# Patient Record
Sex: FEMALE | Race: WHITE | NOT HISPANIC OR LATINO | Employment: FULL TIME | URBAN - METROPOLITAN AREA
[De-identification: names, ages, dates, MRNs, and addresses within clinical notes are randomized per-mention and may not be internally consistent; named-entity substitution may affect disease eponyms.]

---

## 2017-06-21 ENCOUNTER — ANESTHESIA (OUTPATIENT)
Dept: LABOR AND DELIVERY | Facility: HOSPITAL | Age: 32
End: 2017-06-21

## 2017-06-21 ENCOUNTER — HOSPITAL ENCOUNTER (INPATIENT)
Facility: HOSPITAL | Age: 32
LOS: 2 days | Discharge: HOME | End: 2017-06-23
Attending: OBSTETRICS & GYNECOLOGY | Admitting: OBSTETRICS & GYNECOLOGY

## 2017-06-21 ENCOUNTER — ANESTHESIA EVENT (OUTPATIENT)
Dept: LABOR AND DELIVERY | Facility: HOSPITAL | Age: 32
End: 2017-06-21

## 2017-06-21 PROBLEM — Z34.90 PREGNANCY: Status: RESOLVED | Noted: 2017-06-21 | Resolved: 2017-06-21

## 2017-06-21 PROBLEM — Z34.90 PREGNANCY: Status: ACTIVE | Noted: 2017-06-21

## 2017-06-21 LAB
ABO GROUP BLD: NORMAL
ABO GROUP BLD: NORMAL
ALBUMIN SERPL-MCNC: 3.6 G/DL (ref 3.5–5.2)
ALBUMIN/GLOB SERPL: 1.1 G/DL
ALP SERPL-CCNC: 165 U/L (ref 39–117)
ALT SERPL W P-5'-P-CCNC: 12 U/L (ref 1–33)
ANION GAP SERPL CALCULATED.3IONS-SCNC: 16 MMOL/L
AST SERPL-CCNC: 17 U/L (ref 1–32)
BASOPHILS # BLD AUTO: 0.01 10*3/MM3 (ref 0–0.2)
BASOPHILS NFR BLD AUTO: 0.1 % (ref 0–1.5)
BILIRUB SERPL-MCNC: 0.2 MG/DL (ref 0.1–1.2)
BLD GP AB SCN SERPL QL: POSITIVE
BUN BLD-MCNC: 9 MG/DL (ref 6–20)
BUN/CREAT SERPL: 14.3 (ref 7–25)
CALCIUM SPEC-SCNC: 9.8 MG/DL (ref 8.6–10.5)
CHLORIDE SERPL-SCNC: 98 MMOL/L (ref 98–107)
CO2 SERPL-SCNC: 21 MMOL/L (ref 22–29)
CREAT BLD-MCNC: 0.63 MG/DL (ref 0.57–1)
DEPRECATED RDW RBC AUTO: 44.9 FL (ref 37–54)
EOSINOPHIL # BLD AUTO: 0.1 10*3/MM3 (ref 0–0.7)
EOSINOPHIL NFR BLD AUTO: 0.8 % (ref 0.3–6.2)
ERYTHROCYTE [DISTWIDTH] IN BLOOD BY AUTOMATED COUNT: 13.7 % (ref 11.7–13)
FETAL BLEED: NEGATIVE
GFR SERPL CREATININE-BSD FRML MDRD: 110 ML/MIN/1.73
GLOBULIN UR ELPH-MCNC: 3.3 GM/DL
GLUCOSE BLD-MCNC: 91 MG/DL (ref 65–99)
HCT VFR BLD AUTO: 41.2 % (ref 35.6–45.5)
HGB BLD-MCNC: 13.8 G/DL (ref 11.9–15.5)
IMM GRANULOCYTES # BLD: 0.05 10*3/MM3 (ref 0–0.03)
IMM GRANULOCYTES NFR BLD: 0.4 % (ref 0–0.5)
LYMPHOCYTES # BLD AUTO: 2.61 10*3/MM3 (ref 0.9–4.8)
LYMPHOCYTES NFR BLD AUTO: 20.4 % (ref 19.6–45.3)
MCH RBC QN AUTO: 30.5 PG (ref 26.9–32)
MCHC RBC AUTO-ENTMCNC: 33.5 G/DL (ref 32.4–36.3)
MCV RBC AUTO: 90.9 FL (ref 80.5–98.2)
MONOCYTES # BLD AUTO: 1.08 10*3/MM3 (ref 0.2–1.2)
MONOCYTES NFR BLD AUTO: 8.5 % (ref 5–12)
NEUTROPHILS # BLD AUTO: 8.93 10*3/MM3 (ref 1.9–8.1)
NEUTROPHILS NFR BLD AUTO: 69.8 % (ref 42.7–76)
NUMBER OF DOSES: NORMAL
PLATELET # BLD AUTO: 204 10*3/MM3 (ref 140–500)
PMV BLD AUTO: 11.2 FL (ref 6–12)
POTASSIUM BLD-SCNC: 3.9 MMOL/L (ref 3.5–5.2)
PROT SERPL-MCNC: 6.9 G/DL (ref 6–8.5)
RBC # BLD AUTO: 4.53 10*6/MM3 (ref 3.9–5.2)
RESIDUAL RHIG DETECTED: NORMAL
RH BLD: NEGATIVE
RH BLD: NEGATIVE
SODIUM BLD-SCNC: 135 MMOL/L (ref 136–145)
WBC NRBC COR # BLD: 12.78 10*3/MM3 (ref 4.5–10.7)

## 2017-06-21 PROCEDURE — 86900 BLOOD TYPING SEROLOGIC ABO: CPT | Performed by: OBSTETRICS & GYNECOLOGY

## 2017-06-21 PROCEDURE — 10907ZC DRAINAGE OF AMNIOTIC FLUID, THERAPEUTIC FROM PRODUCTS OF CONCEPTION, VIA NATURAL OR ARTIFICIAL OPENING: ICD-10-PCS | Performed by: OBSTETRICS & GYNECOLOGY

## 2017-06-21 PROCEDURE — 85461 HEMOGLOBIN FETAL: CPT | Performed by: OBSTETRICS & GYNECOLOGY

## 2017-06-21 PROCEDURE — 86850 RBC ANTIBODY SCREEN: CPT | Performed by: OBSTETRICS & GYNECOLOGY

## 2017-06-21 PROCEDURE — 80053 COMPREHEN METABOLIC PANEL: CPT | Performed by: OBSTETRICS & GYNECOLOGY

## 2017-06-21 PROCEDURE — 86870 RBC ANTIBODY IDENTIFICATION: CPT | Performed by: OBSTETRICS & GYNECOLOGY

## 2017-06-21 PROCEDURE — 25010000002 RHO D IMMUNE GLOBULIN 1500 UNIT/2ML SOLUTION PREFILLED SYRINGE: Performed by: OBSTETRICS & GYNECOLOGY

## 2017-06-21 PROCEDURE — C1755 CATHETER, INTRASPINAL: HCPCS | Performed by: ANESTHESIOLOGY

## 2017-06-21 PROCEDURE — 0HQ9XZZ REPAIR PERINEUM SKIN, EXTERNAL APPROACH: ICD-10-PCS | Performed by: OBSTETRICS & GYNECOLOGY

## 2017-06-21 PROCEDURE — 86901 BLOOD TYPING SEROLOGIC RH(D): CPT | Performed by: OBSTETRICS & GYNECOLOGY

## 2017-06-21 PROCEDURE — 25010000002 ROPIVACAINE PER 1 MG: Performed by: ANESTHESIOLOGY

## 2017-06-21 PROCEDURE — 85025 COMPLETE CBC W/AUTO DIFF WBC: CPT | Performed by: OBSTETRICS & GYNECOLOGY

## 2017-06-21 RX ORDER — SODIUM CHLORIDE 0.9 % (FLUSH) 0.9 %
1-10 SYRINGE (ML) INJECTION AS NEEDED
Status: DISCONTINUED | OUTPATIENT
Start: 2017-06-21 | End: 2017-06-23 | Stop reason: HOSPADM

## 2017-06-21 RX ORDER — SODIUM CHLORIDE, SODIUM LACTATE, POTASSIUM CHLORIDE, CALCIUM CHLORIDE 600; 310; 30; 20 MG/100ML; MG/100ML; MG/100ML; MG/100ML
125 INJECTION, SOLUTION INTRAVENOUS CONTINUOUS
Status: DISCONTINUED | OUTPATIENT
Start: 2017-06-21 | End: 2017-06-23 | Stop reason: HOSPADM

## 2017-06-21 RX ORDER — CALCIUM CARBONATE 200(500)MG
2 TABLET,CHEWABLE ORAL 3 TIMES DAILY PRN
Status: DISCONTINUED | OUTPATIENT
Start: 2017-06-21 | End: 2017-06-23 | Stop reason: HOSPADM

## 2017-06-21 RX ORDER — MINERAL OIL
OIL (ML) MISCELLANEOUS AS NEEDED
Status: DISCONTINUED | OUTPATIENT
Start: 2017-06-21 | End: 2017-06-21 | Stop reason: HOSPADM

## 2017-06-21 RX ORDER — MISOPROSTOL 200 UG/1
800 TABLET ORAL AS NEEDED
Status: DISCONTINUED | OUTPATIENT
Start: 2017-06-21 | End: 2017-06-21 | Stop reason: HOSPADM

## 2017-06-21 RX ORDER — CARBOPROST TROMETHAMINE 250 UG/ML
250 INJECTION, SOLUTION INTRAMUSCULAR AS NEEDED
Status: DISCONTINUED | OUTPATIENT
Start: 2017-06-21 | End: 2017-06-21 | Stop reason: HOSPADM

## 2017-06-21 RX ORDER — IBUPROFEN 800 MG/1
800 TABLET ORAL EVERY 8 HOURS PRN
Status: DISCONTINUED | OUTPATIENT
Start: 2017-06-21 | End: 2017-06-23 | Stop reason: HOSPADM

## 2017-06-21 RX ORDER — DOCUSATE SODIUM 100 MG/1
100 CAPSULE, LIQUID FILLED ORAL 2 TIMES DAILY
Status: DISCONTINUED | OUTPATIENT
Start: 2017-06-21 | End: 2017-06-23 | Stop reason: HOSPADM

## 2017-06-21 RX ORDER — METHYLERGONOVINE MALEATE 0.2 MG/ML
200 INJECTION INTRAVENOUS ONCE AS NEEDED
Status: DISCONTINUED | OUTPATIENT
Start: 2017-06-21 | End: 2017-06-21 | Stop reason: HOSPADM

## 2017-06-21 RX ORDER — PHYTONADIONE 1 MG/.5ML
INJECTION, EMULSION INTRAMUSCULAR; INTRAVENOUS; SUBCUTANEOUS
Status: DISPENSED
Start: 2017-06-21 | End: 2017-06-21

## 2017-06-21 RX ORDER — TERBUTALINE SULFATE 1 MG/ML
0.25 INJECTION, SOLUTION SUBCUTANEOUS AS NEEDED
Status: DISCONTINUED | OUTPATIENT
Start: 2017-06-21 | End: 2017-06-21 | Stop reason: HOSPADM

## 2017-06-21 RX ORDER — OXYTOCIN/RINGER'S LACTATE 10/500ML
125 PLASTIC BAG, INJECTION (ML) INTRAVENOUS CONTINUOUS PRN
Status: DISCONTINUED | OUTPATIENT
Start: 2017-06-21 | End: 2017-06-21 | Stop reason: HOSPADM

## 2017-06-21 RX ORDER — OXYCODONE HYDROCHLORIDE AND ACETAMINOPHEN 5; 325 MG/1; MG/1
1 TABLET ORAL EVERY 4 HOURS PRN
Status: DISCONTINUED | OUTPATIENT
Start: 2017-06-21 | End: 2017-06-23 | Stop reason: HOSPADM

## 2017-06-21 RX ORDER — ERYTHROMYCIN 5 MG/G
OINTMENT OPHTHALMIC
Status: DISPENSED
Start: 2017-06-21 | End: 2017-06-21

## 2017-06-21 RX ORDER — FAMOTIDINE 10 MG/ML
20 INJECTION, SOLUTION INTRAVENOUS ONCE AS NEEDED
Status: DISCONTINUED | OUTPATIENT
Start: 2017-06-21 | End: 2017-06-21 | Stop reason: HOSPADM

## 2017-06-21 RX ORDER — PROMETHAZINE HYDROCHLORIDE 12.5 MG/1
12.5 TABLET ORAL EVERY 4 HOURS PRN
Status: DISCONTINUED | OUTPATIENT
Start: 2017-06-21 | End: 2017-06-23 | Stop reason: HOSPADM

## 2017-06-21 RX ORDER — SODIUM CHLORIDE 0.9 % (FLUSH) 0.9 %
1-10 SYRINGE (ML) INJECTION AS NEEDED
Status: DISCONTINUED | OUTPATIENT
Start: 2017-06-21 | End: 2017-06-21 | Stop reason: HOSPADM

## 2017-06-21 RX ORDER — LIDOCAINE HYDROCHLORIDE AND EPINEPHRINE 15; 5 MG/ML; UG/ML
INJECTION, SOLUTION EPIDURAL AS NEEDED
Status: DISCONTINUED | OUTPATIENT
Start: 2017-06-21 | End: 2017-06-21 | Stop reason: SURG

## 2017-06-21 RX ORDER — OXYCODONE HYDROCHLORIDE AND ACETAMINOPHEN 5; 325 MG/1; MG/1
2 TABLET ORAL EVERY 4 HOURS PRN
Status: DISCONTINUED | OUTPATIENT
Start: 2017-06-21 | End: 2017-06-23 | Stop reason: HOSPADM

## 2017-06-21 RX ORDER — PRENATAL VIT NO.126/IRON/FOLIC 28MG-0.8MG
1 TABLET ORAL DAILY
Status: DISCONTINUED | OUTPATIENT
Start: 2017-06-21 | End: 2017-06-23 | Stop reason: HOSPADM

## 2017-06-21 RX ORDER — EPHEDRINE SULFATE 50 MG/ML
5 INJECTION, SOLUTION INTRAVENOUS
Status: DISCONTINUED | OUTPATIENT
Start: 2017-06-21 | End: 2017-06-21 | Stop reason: HOSPADM

## 2017-06-21 RX ORDER — PRENATAL VIT NO.126/IRON/FOLIC 28MG-0.8MG
1 TABLET ORAL DAILY
COMMUNITY
End: 2019-06-14

## 2017-06-21 RX ORDER — DIPHENHYDRAMINE HYDROCHLORIDE 50 MG/ML
12.5 INJECTION INTRAMUSCULAR; INTRAVENOUS EVERY 8 HOURS PRN
Status: DISCONTINUED | OUTPATIENT
Start: 2017-06-21 | End: 2017-06-21 | Stop reason: HOSPADM

## 2017-06-21 RX ORDER — ROPIVACAINE HYDROCHLORIDE 2 MG/ML
INJECTION, SOLUTION EPIDURAL; INFILTRATION; PERINEURAL AS NEEDED
Status: DISCONTINUED | OUTPATIENT
Start: 2017-06-21 | End: 2017-06-21 | Stop reason: SURG

## 2017-06-21 RX ORDER — OXYTOCIN/RINGER'S LACTATE 10/500ML
999 PLASTIC BAG, INJECTION (ML) INTRAVENOUS ONCE
Status: COMPLETED | OUTPATIENT
Start: 2017-06-21 | End: 2017-06-21

## 2017-06-21 RX ORDER — LANOLIN 100 %
OINTMENT (GRAM) TOPICAL
Status: DISCONTINUED | OUTPATIENT
Start: 2017-06-21 | End: 2017-06-23 | Stop reason: HOSPADM

## 2017-06-21 RX ORDER — LIDOCAINE HYDROCHLORIDE 10 MG/ML
5 INJECTION, SOLUTION INFILTRATION; PERINEURAL AS NEEDED
Status: DISCONTINUED | OUTPATIENT
Start: 2017-06-21 | End: 2017-06-21 | Stop reason: HOSPADM

## 2017-06-21 RX ORDER — OXYCODONE AND ACETAMINOPHEN 10; 325 MG/1; MG/1
1 TABLET ORAL EVERY 4 HOURS PRN
Status: DISCONTINUED | OUTPATIENT
Start: 2017-06-21 | End: 2017-06-23 | Stop reason: HOSPADM

## 2017-06-21 RX ORDER — ZOLPIDEM TARTRATE 5 MG/1
5 TABLET ORAL NIGHTLY PRN
Status: DISCONTINUED | OUTPATIENT
Start: 2017-06-21 | End: 2017-06-23 | Stop reason: HOSPADM

## 2017-06-21 RX ORDER — BISACODYL 10 MG
10 SUPPOSITORY, RECTAL RECTAL DAILY PRN
Status: DISCONTINUED | OUTPATIENT
Start: 2017-06-22 | End: 2017-06-23 | Stop reason: HOSPADM

## 2017-06-21 RX ORDER — ONDANSETRON 2 MG/ML
4 INJECTION INTRAMUSCULAR; INTRAVENOUS ONCE AS NEEDED
Status: DISCONTINUED | OUTPATIENT
Start: 2017-06-21 | End: 2017-06-21 | Stop reason: HOSPADM

## 2017-06-21 RX ADMIN — IBUPROFEN 800 MG: 800 TABLET ORAL at 08:12

## 2017-06-21 RX ADMIN — Medication 1 TABLET: at 09:10

## 2017-06-21 RX ADMIN — OXYTOCIN 999 ML/HR: 10 INJECTION, SOLUTION INTRAMUSCULAR; INTRAVENOUS at 03:52

## 2017-06-21 RX ADMIN — SODIUM CHLORIDE, POTASSIUM CHLORIDE, SODIUM LACTATE AND CALCIUM CHLORIDE 1000 ML: 600; 310; 30; 20 INJECTION, SOLUTION INTRAVENOUS at 01:50

## 2017-06-21 RX ADMIN — HUMAN RHO(D) IMMUNE GLOBULIN 300 MCG: 1500 SOLUTION INTRAMUSCULAR; INTRAVENOUS at 09:35

## 2017-06-21 RX ADMIN — ROPIVACAINE HYDROCHLORIDE 5 ML: 2 INJECTION, SOLUTION EPIDURAL; INFILTRATION at 02:14

## 2017-06-21 RX ADMIN — DOCUSATE SODIUM 100 MG: 100 CAPSULE, LIQUID FILLED ORAL at 18:33

## 2017-06-21 RX ADMIN — ROPIVACAINE HYDROCHLORIDE 5 ML: 2 INJECTION, SOLUTION EPIDURAL; INFILTRATION at 02:06

## 2017-06-21 RX ADMIN — Medication 11 ML/HR: at 02:15

## 2017-06-21 RX ADMIN — LIDOCAINE HYDROCHLORIDE AND EPINEPHRINE 3 ML: 15; 5 INJECTION, SOLUTION EPIDURAL at 02:11

## 2017-06-21 RX ADMIN — IBUPROFEN 800 MG: 800 TABLET ORAL at 16:11

## 2017-06-21 RX ADMIN — DOCUSATE SODIUM 100 MG: 100 CAPSULE, LIQUID FILLED ORAL at 09:10

## 2017-06-21 RX ADMIN — BENZOCAINE AND MENTHOL: 20; .5 SPRAY TOPICAL at 09:10

## 2017-06-21 RX ADMIN — SODIUM CHLORIDE, POTASSIUM CHLORIDE, SODIUM LACTATE AND CALCIUM CHLORIDE 125 ML/HR: 600; 310; 30; 20 INJECTION, SOLUTION INTRAVENOUS at 02:41

## 2017-06-21 RX ADMIN — OXYTOCIN 125 ML/HR: 10 INJECTION, SOLUTION INTRAMUSCULAR; INTRAVENOUS at 04:22

## 2017-06-21 NOTE — PLAN OF CARE
Problem: Breastfeeding (Adult,NICU,[place],Obstetrics,Pediatric)  Goal: Signs and Symptoms of Listed Potential Problems Will be Absent or Manageable (Breastfeeding)  Outcome: Ongoing (interventions implemented as appropriate)

## 2017-06-21 NOTE — ANESTHESIA PREPROCEDURE EVALUATION
Anesthesia Evaluation     Patient summary reviewed and Nursing notes reviewed   no history of anesthetic complications:  NPO Solid Status: > 4 hours  NPO Liquid Status: > 4 hours     Airway   Mallampati: II  TM distance: >3 FB  Neck ROM: full  no difficulty expected  Dental - normal exam     Pulmonary - negative pulmonary ROS and normal exam    breath sounds clear to auscultation  (-) rhonchi, decreased breath sounds, wheezes, rales, stridor  Cardiovascular - negative cardio ROS and normal exam    Rhythm: regular  Rate: normal    (-) murmur, weak pulses, friction rub, systolic click, carotid bruits, JVD, peripheral edema      Neuro/Psych- negative ROS  GI/Hepatic/Renal/Endo - negative ROS     Musculoskeletal (-) negative ROS    Abdominal  - normal exam    Abdomen: soft.   Substance History - negative use     OB/GYN    (+) Pregnant, pregnancy induced hypertension        Other - negative ROS                                       Anesthesia Plan    ASA 2     epidural     Anesthetic plan and risks discussed with patient and spouse/significant other.

## 2017-06-21 NOTE — ANESTHESIA PROCEDURE NOTES
Labor Epidural    Patient location during procedure: OB  Indication:at surgeon's request  Performed By  Anesthesiologist: DEBRA VALENTIN  Preanesthetic Checklist  Completed: patient identified, site marked, surgical consent, pre-op evaluation, timeout performed, IV checked, risks and benefits discussed and monitors and equipment checked  Epidural Block Prep:  Pt Position:sitting  Sterile Tech:cap, gloves, mask and sterile barrier  Prep:chlorhexidine gluconate and isopropyl alcohol  Monitoring:blood pressure monitoring and EKG  Epidural Block Procedure:  Approach:midline  Guidance:landmark technique  Location:L2-L3  Needle Type:Tuohy  Needle Gauge:17  Cath Depth at skin:12 cm  Paresthesia: none  Aspiration:negative  Test Dose:negative  Number of Attempts: 1  Post Assessment:  Dressing:occlusive dressing applied, secured with tape and biopatch applied  Pt Tolerance:patient tolerated the procedure well with no apparent complications  Complications:no

## 2017-06-21 NOTE — PLAN OF CARE
Problem: Postpartum, Vaginal Delivery (Adult)  Goal: Signs and Symptoms of Listed Potential Problems Will be Absent or Manageable (Postpartum, Vaginal Delivery)  Outcome: Ongoing (interventions implemented as appropriate)    06/21/17 0506   Postpartum, Vaginal Delivery   Problems Assessed (Postpartum Vaginal Delivery) all   Problems Present (Postpartum Vaginal Delivery) none

## 2017-06-21 NOTE — LACTATION NOTE
P2. Term infant. Nursed a couple times since delivery.  Woke infant for feeding now.  He tries to latch shallowly.  Walked pt through deeper latch.  Audible swallows noted.

## 2017-06-21 NOTE — PLAN OF CARE
Problem: Patient Care Overview (Adult)  Goal: Plan of Care Review  Outcome: Ongoing (interventions implemented as appropriate)    17 [id number]   Coping/Psychosocial Response Interventions   Plan Of Care Reviewed With patient   Patient Care Overview   Progress progress toward functional goals as expected   Outcome Evaluation   Outcome Summary/Follow up Plan VMT, Motrin for pain, Rhophylac given, brst feeding fair--infant sleepy, amb in halls       Goal: Adult Individualization and Mutuality  Outcome: Ongoing (interventions implemented as appropriate)  Goal: Discharge Needs Assessment  Outcome: Ongoing (interventions implemented as appropriate)    Problem: Fall Risk  (Adult,Obstetrics,Pediatric)  Goal: Identify Related Risk Factors and Signs and Symptoms  Outcome: Ongoing (interventions implemented as appropriate)  Goal: Absence of Maternal Fall  Outcome: Ongoing (interventions implemented as appropriate)  Goal: Absence of [place] Fall/Drop  Outcome: Ongoing (interventions implemented as appropriate)    Problem: Postpartum, Vaginal Delivery (Adult)  Goal: Signs and Symptoms of Listed Potential Problems Will be Absent or Manageable (Postpartum, Vaginal Delivery)  Outcome: Ongoing (interventions implemented as appropriate)    Problem: Breastfeeding (Adult,NICU,,Obstetrics,Pediatric)  Goal: Signs and Symptoms of Listed Potential Problems Will be Absent or Manageable (Breastfeeding)  Outcome: Ongoing (interventions implemented as appropriate)

## 2017-06-21 NOTE — PROGRESS NOTES
"PPD 0     No c/o    /92 (BP Location: Right arm, Patient Position: Sitting)  Pulse 67  Temp 97 °F (36.1 °C) (Oral)   Resp 20  Ht 64\" (162.6 cm)  Wt 66.2 kg (146 lb)  SpO2 100%  Breastfeeding? Yes  BMI 25.06 kg/m2    Routine care  "

## 2017-06-21 NOTE — PLAN OF CARE
Problem: Patient Care Overview (Adult)  Goal: Plan of Care Review  Outcome: Ongoing (interventions implemented as appropriate)    17 0503   Coping/Psychosocial Response Interventions   Plan Of Care Reviewed With patient;spouse   Patient Care Overview   Progress improving       Goal: Adult Individualization and Mutuality  Outcome: Ongoing (interventions implemented as appropriate)    17 0503   Mutuality/Individual Preferences   What Anxieties, Fears or Concerns Do You Have About Your Health or Care? , bleeding, uterine rupture   Individualization   Patient Specific Preferences skin to skin, epidural, breastfeeding   Patient Specific Goals patient will attempt to breastfeed infant within 1hr post delivery       Goal: Discharge Needs Assessment  Outcome: Ongoing (interventions implemented as appropriate)    17 0503   Discharge Needs Assessment   Concerns To Be Addressed no discharge needs identified   Readmission Within The Last 30 Days no previous admission in last 30 days   Equipment Needed After Discharge none   Discharge Disposition home or self-care   Current Health   Anticipated Changes Related to Illness none   Self-Care   Equipment Currently Used at Home none   Living Environment   Transportation Available car         Problem: Labor (Cervical Ripen, Induct, Augment) (Adult,Obstetrics,Pediatric)  Goal: Signs and Symptoms of Listed Potential Problems Will be Absent or Manageable (Labor)  Outcome: Outcome(s) achieved Date Met:  17 0503   Labor (Cervical Ripen, Induct, Augment)   Problems Assessed (Labor) all   Problems Present (Labor) none         Problem: Fall Risk  (Adult,Obstetrics,Pediatric)  Goal: Identify Related Risk Factors and Signs and Symptoms  Outcome: Ongoing (interventions implemented as appropriate)    17 0503   Fall Risk    Fall Risk: Related Risk Factors balance change;medication side effects;fatigue;pregnancy weight gain       Goal:  Absence of Maternal Fall  Outcome: Ongoing (interventions implemented as appropriate)    17 0503   Fall Risk  (Adult,Obstetrics,Pediatric)   Absence of Maternal Fall achieves outcome       Goal: Absence of  Fall/Drop  Outcome: Ongoing (interventions implemented as appropriate)    17 0503   Fall Risk  (Adult,Obstetrics,Pediatric)   Absence of [place] Fall/Drop achieves outcome

## 2017-06-21 NOTE — L&D DELIVERY NOTE
[facility]  Vaginal Delivery Note    Patient Name: Sulma Flores  :  1985  MRN:  [id number]      Delivery     Delivery: , Spontaneous     YOB: 2016    Time of Birth: 3:46 AM      Anesthesia: Epidural     Delivering clinician: Nhung Rodríguez MD       Infant    Findings: Viable male  infant    Infant observations: Weight: No birth weight on file.   Observations/Comments:         Apgars: 8   @ 1 minute /    9   @ 5 minutes     Placenta, Cord, and Fluid    Placenta delivered  Spontaneous   at    3:52 AM     Cord: 3 vessels  present.   Cord blood obtained: Yes    Cord gases obtained:  No      Repair    Episiotomy: No   Lacerations: 1st   Estimated Blood Loss: 200  mls.       Delivery narrative: The patient is a 32 y.o.  at 38w0d.  Presented  In labor. Progressed normally to C/C/+1 with AROM. Fetal status reassuring throughout.  of viable male infant  @ 3:46 AM  over an intact perineum. ASDE. No birth weight on file. .  Placenta delivered spontaneously, intact with 3 vessel cord. Cervix and rectum intact. 1st degree laceration repaired in usual fashion with vicryl suture. Mother and baby recovering good condition.       Nuhng Rodríguez MD  17  4:06 AM

## 2017-06-22 PROBLEM — O13.9 GESTATIONAL HYPERTENSION: Status: ACTIVE | Noted: 2017-06-22

## 2017-06-22 LAB
BASOPHILS # BLD AUTO: 0.02 10*3/MM3 (ref 0–0.2)
BASOPHILS NFR BLD AUTO: 0.2 % (ref 0–1.5)
DEPRECATED RDW RBC AUTO: 46.6 FL (ref 37–54)
EOSINOPHIL # BLD AUTO: 0.15 10*3/MM3 (ref 0–0.7)
EOSINOPHIL NFR BLD AUTO: 1.2 % (ref 0.3–6.2)
ERYTHROCYTE [DISTWIDTH] IN BLOOD BY AUTOMATED COUNT: 13.9 % (ref 11.7–13)
HCT VFR BLD AUTO: 38.1 % (ref 35.6–45.5)
HGB BLD-MCNC: 12.3 G/DL (ref 11.9–15.5)
IMM GRANULOCYTES # BLD: 0.05 10*3/MM3 (ref 0–0.03)
IMM GRANULOCYTES NFR BLD: 0.4 % (ref 0–0.5)
LYMPHOCYTES # BLD AUTO: 2.18 10*3/MM3 (ref 0.9–4.8)
LYMPHOCYTES NFR BLD AUTO: 17.8 % (ref 19.6–45.3)
MCH RBC QN AUTO: 29.9 PG (ref 26.9–32)
MCHC RBC AUTO-ENTMCNC: 32.3 G/DL (ref 32.4–36.3)
MCV RBC AUTO: 92.5 FL (ref 80.5–98.2)
MONOCYTES # BLD AUTO: 0.86 10*3/MM3 (ref 0.2–1.2)
MONOCYTES NFR BLD AUTO: 7 % (ref 5–12)
NEUTROPHILS # BLD AUTO: 9.01 10*3/MM3 (ref 1.9–8.1)
NEUTROPHILS NFR BLD AUTO: 73.4 % (ref 42.7–76)
PLATELET # BLD AUTO: 190 10*3/MM3 (ref 140–500)
PMV BLD AUTO: 11.2 FL (ref 6–12)
RBC # BLD AUTO: 4.12 10*6/MM3 (ref 3.9–5.2)
WBC NRBC COR # BLD: 12.27 10*3/MM3 (ref 4.5–10.7)

## 2017-06-22 PROCEDURE — 85025 COMPLETE CBC W/AUTO DIFF WBC: CPT | Performed by: NURSE PRACTITIONER

## 2017-06-22 RX ADMIN — Medication 1 TABLET: at 08:16

## 2017-06-22 RX ADMIN — IBUPROFEN 800 MG: 800 TABLET ORAL at 02:17

## 2017-06-22 RX ADMIN — IBUPROFEN 800 MG: 800 TABLET ORAL at 10:16

## 2017-06-22 RX ADMIN — IBUPROFEN 800 MG: 800 TABLET ORAL at 18:30

## 2017-06-22 RX ADMIN — DOCUSATE SODIUM 100 MG: 100 CAPSULE, LIQUID FILLED ORAL at 18:30

## 2017-06-22 RX ADMIN — DOCUSATE SODIUM 100 MG: 100 CAPSULE, LIQUID FILLED ORAL at 08:16

## 2017-06-22 NOTE — PROGRESS NOTES
[facility]  Vaginal Delivery Progress Note    Patient Name: Sulma Flores  :  1985  MRN:  [id number]      Subjective   Postpartum Day 1: Vaginal Delivery--  of a male infant.     The patient feels well without complaints.  Her pain is well controlled.  Reports normal lochia.     The patient plans to breastfeed.    Objective     Vital Signs Range for the last 24 hours  Temperature: Temp:  [97.9 °F (36.6 °C)-98.2 °F (36.8 °C)] 97.9 °F (36.6 °C)       BP: BP: (122-154)/() 130/86   Pulse: Heart Rate:  [73-79] 73   Respirations: Resp:  [16] 16                       Physical Exam:  General: Awake and alert  Abdomen: Fundus: firm, non tender, 1 below umbilicus  Extremities:  trace edema, NT     Labs:     Lab Results   Component Value Date    WBC 12.27 (H) 2017    HGB 12.3 2017    HCT 38.1 2017    MCV 92.5 2017     2017     Prenatal labs results reviewed:  Yes   Rubella:  Immune  Rh Status:    RH type   Date Value Ref Range Status   2017 Negative  Final         Assessment/Plan  : 1. PPD1 S/P  - Doing well, continue usual cares.      GHTN-- BP stable     Baby boy needs circ-- d/w        Active Problems:    Gestational hypertension          Anny Bashir, APRN  2017  8:34 AM

## 2017-06-22 NOTE — LACTATION NOTE
This note was copied from a baby's chart.  P2. Mom has 2 year old at home. Denies problems with this boy . He had just finished nursing and was sleeping on mom's chest. Has  numbers

## 2017-06-23 VITALS
RESPIRATION RATE: 18 BRPM | BODY MASS INDEX: 24.92 KG/M2 | SYSTOLIC BLOOD PRESSURE: 138 MMHG | TEMPERATURE: 98.4 F | WEIGHT: 146 LBS | DIASTOLIC BLOOD PRESSURE: 83 MMHG | HEIGHT: 64 IN | OXYGEN SATURATION: 100 % | HEART RATE: 71 BPM

## 2017-06-23 RX ORDER — IBUPROFEN 800 MG/1
800 TABLET ORAL EVERY 8 HOURS PRN
Qty: 30 TABLET | Refills: 3 | Status: SHIPPED | OUTPATIENT
Start: 2017-06-23

## 2017-06-23 RX ORDER — OXYCODONE HYDROCHLORIDE AND ACETAMINOPHEN 5; 325 MG/1; MG/1
2 TABLET ORAL EVERY 4 HOURS PRN
Qty: 24 TABLET | Refills: 0 | Status: SHIPPED | OUTPATIENT
Start: 2017-06-23 | End: 2017-06-25

## 2017-06-23 RX ADMIN — IBUPROFEN 800 MG: 800 TABLET ORAL at 09:59

## 2017-06-23 RX ADMIN — Medication 1 TABLET: at 09:59

## 2017-06-23 RX ADMIN — DOCUSATE SODIUM 100 MG: 100 CAPSULE, LIQUID FILLED ORAL at 09:59

## 2017-06-23 NOTE — DISCHARGE SUMMARY
Date of Discharge:  2017    Discharge Diagnosis: vaginal delivery    Presenting Problem/History of Present Illness  Pregnancy [Z33.1]       Hospital Course  Patient is a 32 y.o. female presented with labor at term.  Had successful  viable male infant per Dr. Rodríguez.  Had GHTN-- BP overall have been nl-- few elevated.  No evidence preeclampsia.  Will f/u 2 wk in office.      Procedures Performed  Procedure(s):     Consults:   Consults     No orders found from 2017 to 2017.          Condition on Discharge:   Subjective   Postpartum Day 2 Vaginal Delivery.    The patient feels well without complaints.    Vital Signs  Temp:  [98 °F (36.7 °C)-98.4 °F (36.9 °C)] 98.4 °F (36.9 °C)  Heart Rate:  [71-76] 71  Resp:  [16-18] 18  BP: (120-150)/(62-90) 138/83    Physical Exam:   General: Awake and alert   Abdomen: Fundus: firm, non tender    Extremities:  Calves NT bilaterally    Assessment/Plan     PPD2  S/P  -   Stable for discharge. Instructions reviewed      Discharge Disposition  Home or Self Care    Discharge Medications   Sulma Flores   Home Medication Instructions PERRY:[id number]    Printed on:17 0936   Medication Information                      ibuprofen (ADVIL,MOTRIN) 800 MG tablet  Take 1 tablet by mouth Every 8 (Eight) Hours As Needed for Mild Pain (1-3).             oxyCODONE-acetaminophen (PERCOCET) 5-325 MG per tablet  Take 2 tablets by mouth Every 4 (Four) Hours As Needed for Severe Pain (7-10) for up to 2 days.             Prenatal Vit-Fe Fumarate-FA (PRENATAL, CLASSIC, VITAMIN) 28-0.8 MG tablet tablet  Take 1 tablet by mouth Daily.                   The patient has been prescribed a controlled substance.  She has been counseled on the risks associated with using the medication.   The addictive potential of this medication and alternatives were discussed carefully with this patient and she demonstrated understanding.  A VICTORINA report has been obtained and reviewed.       Activity  at Discharge:     Follow-up Appointments  No future appointments.  Additional Instructions for the Follow-ups that You Need to Schedule     Call MD With Problems / Concerns    As directed    Instructions: call for fever, increased vaginal bleeding or pain, any other concerns                 Test Results Pending at Discharge       BOB Tidwell  06/23/17  9:36 AM

## 2017-06-23 NOTE — LACTATION NOTE
This note was copied from a baby's chart.  P2. Baby cluster fed through the night. Reminded mom that this helps bring milk in . Denies nipple damage. [facility]

## 2017-07-12 ENCOUNTER — HOSPITAL ENCOUNTER (OUTPATIENT)
Dept: LACTATION | Facility: HOSPITAL | Age: 32
Discharge: HOME | End: 2017-07-12

## 2017-07-12 NOTE — LACTATION NOTE
Sulma here with Los because she feels her supply is low and he is not gaining weight well.  Los has gained 1 lb 10oz since birth 3 weeks ago.  He transferred 3.1 ounces in 15 minutes at the breast.  After feeding he is visibly upset with tense belly.  Sulma states he is this way every feeding so she re-feeds him until he falls asleep.  Discussed eliminating dairy, caffeine, and chocolate from diet and reassured her supply was not the issue.  Will discuss with peds on Friday at big brother's check up.  Will return to clinic as needed.  BW: 7-2  Pre: 8-12.2  Post: 8-15.3

## 2017-12-14 ENCOUNTER — TELEPHONE (OUTPATIENT)
Dept: LACTATION | Facility: HOSPITAL | Age: 32
End: 2017-12-14

## 2017-12-14 NOTE — TELEPHONE ENCOUNTER
Retuning call from clinic voicemail.  Message left for Sulma to call us back to discuss her questions.

## 2018-05-02 ENCOUNTER — OFFICE (AMBULATORY)
Dept: URBAN - METROPOLITAN AREA CLINIC 75 | Facility: CLINIC | Age: 33
End: 2018-05-02

## 2018-05-02 VITALS
HEART RATE: 93 BPM | DIASTOLIC BLOOD PRESSURE: 70 MMHG | HEIGHT: 62 IN | SYSTOLIC BLOOD PRESSURE: 118 MMHG | WEIGHT: 118 LBS

## 2018-05-02 DIAGNOSIS — K62.89 OTHER SPECIFIED DISEASES OF ANUS AND RECTUM: ICD-10-CM

## 2018-05-02 DIAGNOSIS — K59.00 CONSTIPATION, UNSPECIFIED: ICD-10-CM

## 2018-05-02 DIAGNOSIS — K62.5 HEMORRHAGE OF ANUS AND RECTUM: ICD-10-CM

## 2018-05-02 DIAGNOSIS — R10.30 LOWER ABDOMINAL PAIN, UNSPECIFIED: ICD-10-CM

## 2018-05-02 PROCEDURE — 99204 OFFICE O/P NEW MOD 45 MIN: CPT | Performed by: INTERNAL MEDICINE

## 2018-06-18 VITALS
HEART RATE: 98 BPM | DIASTOLIC BLOOD PRESSURE: 73 MMHG | HEART RATE: 74 BPM | RESPIRATION RATE: 16 BRPM | DIASTOLIC BLOOD PRESSURE: 69 MMHG | OXYGEN SATURATION: 97 % | HEART RATE: 67 BPM | RESPIRATION RATE: 14 BRPM | SYSTOLIC BLOOD PRESSURE: 116 MMHG | DIASTOLIC BLOOD PRESSURE: 72 MMHG | RESPIRATION RATE: 20 BRPM | DIASTOLIC BLOOD PRESSURE: 65 MMHG | HEART RATE: 71 BPM | RESPIRATION RATE: 28 BRPM | SYSTOLIC BLOOD PRESSURE: 113 MMHG | SYSTOLIC BLOOD PRESSURE: 146 MMHG | HEART RATE: 73 BPM | OXYGEN SATURATION: 100 % | DIASTOLIC BLOOD PRESSURE: 66 MMHG | DIASTOLIC BLOOD PRESSURE: 55 MMHG | DIASTOLIC BLOOD PRESSURE: 64 MMHG | DIASTOLIC BLOOD PRESSURE: 74 MMHG | SYSTOLIC BLOOD PRESSURE: 142 MMHG | DIASTOLIC BLOOD PRESSURE: 59 MMHG | HEART RATE: 68 BPM | TEMPERATURE: 99 F | HEART RATE: 72 BPM | SYSTOLIC BLOOD PRESSURE: 118 MMHG | HEIGHT: 62 IN | WEIGHT: 118 LBS | SYSTOLIC BLOOD PRESSURE: 125 MMHG | RESPIRATION RATE: 17 BRPM | HEART RATE: 85 BPM | RESPIRATION RATE: 19 BRPM | OXYGEN SATURATION: 99 % | SYSTOLIC BLOOD PRESSURE: 122 MMHG | OXYGEN SATURATION: 94 % | TEMPERATURE: 98.1 F | HEART RATE: 76 BPM | DIASTOLIC BLOOD PRESSURE: 99 MMHG | SYSTOLIC BLOOD PRESSURE: 120 MMHG | RESPIRATION RATE: 18 BRPM | DIASTOLIC BLOOD PRESSURE: 84 MMHG

## 2018-06-19 ENCOUNTER — AMBULATORY SURGICAL CENTER (AMBULATORY)
Dept: URBAN - METROPOLITAN AREA SURGERY 17 | Facility: SURGERY | Age: 33
End: 2018-06-19

## 2018-06-19 DIAGNOSIS — R10.30 LOWER ABDOMINAL PAIN, UNSPECIFIED: ICD-10-CM

## 2018-06-19 DIAGNOSIS — K64.8 OTHER HEMORRHOIDS: ICD-10-CM

## 2018-06-19 DIAGNOSIS — K62.5 HEMORRHAGE OF ANUS AND RECTUM: ICD-10-CM

## 2018-06-19 DIAGNOSIS — K62.89 OTHER SPECIFIED DISEASES OF ANUS AND RECTUM: ICD-10-CM

## 2018-06-19 DIAGNOSIS — K59.00 CONSTIPATION, UNSPECIFIED: ICD-10-CM

## 2018-06-19 PROCEDURE — 45378 DIAGNOSTIC COLONOSCOPY: CPT | Performed by: INTERNAL MEDICINE

## 2018-06-19 RX ADMIN — PROPOFOL 25 MG: 10 INJECTION, EMULSION INTRAVENOUS at 12:25

## 2018-06-19 RX ADMIN — PROPOFOL 25 MG: 10 INJECTION, EMULSION INTRAVENOUS at 12:22

## 2018-06-19 RX ADMIN — LIDOCAINE HYDROCHLORIDE 25 MG: 10 INJECTION, SOLUTION EPIDURAL; INFILTRATION; INTRACAUDAL; PERINEURAL at 12:20

## 2018-06-19 RX ADMIN — PROPOFOL 25 MG: 10 INJECTION, EMULSION INTRAVENOUS at 12:30

## 2018-06-19 RX ADMIN — PROPOFOL 25 MG: 10 INJECTION, EMULSION INTRAVENOUS at 12:23

## 2018-06-19 RX ADMIN — PROPOFOL 25 MG: 10 INJECTION, EMULSION INTRAVENOUS at 12:21

## 2018-06-19 RX ADMIN — PROPOFOL 25 MG: 10 INJECTION, EMULSION INTRAVENOUS at 12:28

## 2018-06-19 RX ADMIN — PROPOFOL 25 MG: 10 INJECTION, EMULSION INTRAVENOUS at 12:32

## 2018-06-19 RX ADMIN — PROPOFOL 100 MG: 10 INJECTION, EMULSION INTRAVENOUS at 12:20

## 2018-06-19 RX ADMIN — PROPOFOL 25 MG: 10 INJECTION, EMULSION INTRAVENOUS at 12:34

## 2018-11-16 NOTE — PROGRESS NOTES
Subjective   Patient ID: Sulma Flores is a 33 y.o. female is being seen for consultation today at the request of Laisha Parnell for Chiari Malformation.  Patient states that she is having dizziness, headaches and neck pain. She states that her symptoms started about one month ago.  She has intermittent tingling in both her hands and feet, she sometimes feels like she is office balance.  Patient states that she has had double/blurry vision in the past, but not recently.  Patient states that her headaches have improved in past couple weeks since curtailing some of her physical activities.  She denies confusion or problems with memory, but sometimes feels as if she is in a fog.  Patient denies problems with swallowing.  She denies weakness, but sometimes her extremities feel heavy.  She had a brain MRI at [place] 10.31.2018.    Neurologic Problem   The patient's primary symptoms include focal sensory loss, focal weakness, a loss of balance, a visual change and weakness. The patient's pertinent negatives include no altered mental status, clumsiness, memory loss, near-syncope, slurred speech or syncope. This is a recurrent problem. The current episode started more than 1 month ago. The neurological problem developed gradually. The problem has been waxing and waning since onset. There was no focality noted. Associated symptoms include back pain, confusion, dizziness, fatigue, headaches, neck pain and vertigo. Pertinent negatives include no abdominal pain, auditory change, aura, bladder incontinence, bowel incontinence, chest pain, diaphoresis, fever, light-headedness, nausea, palpitations, shortness of breath or vomiting. Past treatments include drinking, eating and medication. The treatment provided no relief.       The following portions of the patient's history were reviewed and updated as appropriate: allergies, current medications, past family history, past medical history, past social history, past  surgical history and problem list.    Review of Systems   Constitutional: Positive for fatigue. Negative for diaphoresis and fever.   Respiratory: Negative for shortness of breath.    Cardiovascular: Negative for chest pain, palpitations and near-syncope.   Gastrointestinal: Negative for abdominal pain, bowel incontinence, nausea and vomiting.   Genitourinary: Negative for bladder incontinence.   Musculoskeletal: Positive for back pain and neck pain.   Neurological: Positive for dizziness, vertigo, focal weakness, weakness, headaches and loss of balance. Negative for syncope and light-headedness.   Psychiatric/Behavioral: Positive for confusion. Negative for memory loss.   All other systems reviewed and are negative.    The patient is a physical therapist and quite healthy. She has had 2 children, the first by  section and the second by vaginal birth. Over the last several months she has begun to have symptoms that have ranged from a sense of mental fogginess to chronic fatigue to blurred vision to some headache and mild neck pain, to numbness and tingling in the arms and hands. She does have a history of some anxiety, but she was eventually worked up and found to have a 7 mm Chiari type 1 malformation. I think most of her symptoms actually correspond to the malformation. It is on the small side and generally speaking we try and manage these with activity modification and perhaps medicines for headaches such as gabapentin. It is not really the headache that bothers her the most. It is the numbness and tingling in the hands and the mental fogginess. The headaches have actually improved. She stopped doing some high impact exercise and that seems to have helped her headaches. I suggested that we get a so-call CINE MRI to look at the CSF flow across the foramen magnum and a cervical MRI just to make sure that there is no cord compression. Already her symptoms have been managed by activity modification and that  may be the way to deal with this for now. Only if the symptoms progress and are intractable would surgical treatment be considerable. We will discuss the options after the new pictures are obtained.        Objective   Physical Exam   Constitutional: She is oriented to person, place, and time. She appears well-developed and well-nourished.   HENT:   Head: Normocephalic and atraumatic.   Eyes: Conjunctivae and EOM are normal. Pupils are equal, round, and reactive to light.   Fundoscopic exam:       The right eye shows no papilledema. The right eye shows venous pulsations.        The left eye shows no papilledema. The left eye shows venous pulsations.   Neck: Carotid bruit is not present.   Neurological: She is oriented to person, place, and time. She has a normal Finger-Nose-Finger Test and a normal Heel to Shin Test. Gait normal.   Reflex Scores:       Tricep reflexes are 2+ on the right side and 2+ on the left side.       Bicep reflexes are 2+ on the right side and 2+ on the left side.       Brachioradialis reflexes are 2+ on the right side and 2+ on the left side.       Patellar reflexes are 2+ on the right side and 2+ on the left side.       Achilles reflexes are 2+ on the right side and 2+ on the left side.  Psychiatric: Her speech is normal.     Neurologic Exam     Mental Status   Oriented to person, place, and time.   Registration of memory: Good recent and remote memory.   Attention: normal. Concentration: normal.   Speech: speech is normal   Level of consciousness: alert  Knowledge: consistent with education.     Cranial Nerves     CN II   Visual fields full to confrontation.   Visual acuity: normal    CN III, IV, VI   Pupils are equal, round, and reactive to light.  Extraocular motions are normal.     CN V   Facial sensation intact.   Right corneal reflex: normal  Left corneal reflex: normal    CN VII   Facial expression full, symmetric.   Right facial weakness: none  Left facial weakness: none    CN VIII    Hearing: intact    CN IX, X   Palate: symmetric    CN XI   Right sternocleidomastoid strength: normal  Left sternocleidomastoid strength: normal    CN XII   Tongue: not atrophic  Tongue deviation: none    Motor Exam   Muscle bulk: normal  Right arm tone: normal  Left arm tone: normal  Right leg tone: normal  Left leg tone: normal    Strength   Strength 5/5 except as noted.     Sensory Exam   Light touch normal.     Gait, Coordination, and Reflexes     Gait  Gait: normal    Coordination   Finger to nose coordination: normal  Heel to shin coordination: normal    Reflexes   Right brachioradialis: 2+  Left brachioradialis: 2+  Right biceps: 2+  Left biceps: 2+  Right triceps: 2+  Left triceps: 2+  Right patellar: 2+  Left patellar: 2+  Right achilles: 2+  Left achilles: 2+  Right : 2+  Left : 2+      Assessment/Plan   Independent Review of Radiographic Studies:    Recent brain MRI that does show a 7 mm type I Chiari malformation with some diminished CSF spaces around the foramen magnum.  Agree with the report.      Medical Decision Making:    I suspect we'll observe this malformation but it would be helpful to get a CINE MRI to look at the flow of cerebrospinal fluid across the foramen magnum as well as a cervical MRI to rule out cervical cord or root compression.  We'll bring her back to discuss the plan when she gets the pictures done.      Sulma was seen today for headache, dizziness and neck pain.    Diagnoses and all orders for this visit:    Chiari malformation type I (CMS/HCC)  -     MRI Brain Without Contrast; Future    Numbness and tingling  -     MRI Cervical Spine Without Contrast; Future    Degeneration of intervertebral disc at C5-C6 level  -     MRI Cervical Spine Without Contrast; Future      Return in about 2 weeks (around 12/5/2018) for After Tests.

## 2018-11-20 ENCOUNTER — OFFICE VISIT (OUTPATIENT)
Dept: NEUROSURGERY | Facility: CLINIC | Age: 33
End: 2018-11-20

## 2018-11-20 VITALS
WEIGHT: 119.4 LBS | BODY MASS INDEX: 20.38 KG/M2 | DIASTOLIC BLOOD PRESSURE: 85 MMHG | SYSTOLIC BLOOD PRESSURE: 132 MMHG | HEIGHT: 64 IN | HEART RATE: 77 BPM

## 2018-11-20 DIAGNOSIS — M50.322 DEGENERATION OF INTERVERTEBRAL DISC AT C5-C6 LEVEL: ICD-10-CM

## 2018-11-20 DIAGNOSIS — R20.0 NUMBNESS AND TINGLING: ICD-10-CM

## 2018-11-20 DIAGNOSIS — G93.5 CHIARI MALFORMATION TYPE I (HCC): Primary | ICD-10-CM

## 2018-11-20 DIAGNOSIS — R20.2 NUMBNESS AND TINGLING: ICD-10-CM

## 2018-11-20 PROCEDURE — 99244 OFF/OP CNSLTJ NEW/EST MOD 40: CPT | Performed by: NEUROLOGICAL SURGERY

## 2018-12-21 ENCOUNTER — TELEPHONE (OUTPATIENT)
Dept: NEUROSURGERY | Facility: CLINIC | Age: 33
End: 2018-12-21

## 2018-12-21 NOTE — TELEPHONE ENCOUNTER
Patient had recent MRI of Brain and Cervical Spine and is to see Dr JEAN-BAPTISTE on 12.31.18- she is wanting results now.  Can I let her know that her brain MRI does show a Chiari and she will need to discuss the specifics with Dr JEAN-BAPTISTE?

## 2018-12-21 NOTE — TELEPHONE ENCOUNTER
I think that is fine. She should know that already since her previous imaging revealed it as well. So that should not be a surprise.       Thanks, Anny

## 2018-12-21 NOTE — PROGRESS NOTES
Subjective   Patient ID: Sulma Flores is a 33 y.o. female is here today for follow-up to discuss cervical spine and brain MRI.  Patient was last seen 11.20.18 for headaches, dizziness and neck pain.  Patient states that there has not been any change in her symptoms since last visit.    Neck Pain    The problem has been unchanged. The pain is at a severity of 5/10. Associated symptoms include headaches, numbness, pain with swallowing, tingling, trouble swallowing, a visual change and weakness. Pertinent negatives include no syncope.   Headache    The problem has been unchanged. The pain is at a severity of 5/10. Associated symptoms include dizziness, neck pain, numbness, tingling, a visual change and weakness. Pertinent negatives include no seizures.       The following portions of the patient's history were reviewed and updated as appropriate: allergies, current medications, past family history, past medical history, past social history, past surgical history and problem list.    Review of Systems   HENT: Positive for trouble swallowing.    Cardiovascular: Negative for syncope.   Musculoskeletal: Positive for neck pain.   Neurological: Positive for dizziness, tingling, facial asymmetry, weakness, light-headedness, numbness and headaches. Negative for tremors, seizures, syncope and speech difficulty.   All other systems reviewed and are negative.    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 We discussed the MRI flow study which did show some diminished flow dorsally, as expected. There is no syrinx. She is actually better and the numbness and tingling have improved. Since she has been doing lower impact exercises, the headaches have gotten better. She still has that pensive mental fogginess which I think is probably related to the small Chiari malformation, but I do not think the symptoms are severe enough to do anything surgically. I recommended observation only and modification of activities  including lower impact exercises. She was reassured and I will see her in 6 months.      Objective   Physical Exam   Constitutional: She is oriented to person, place, and time. She appears well-developed and well-nourished.   HENT:   Head: Normocephalic and atraumatic.   Eyes: Conjunctivae and EOM are normal. Pupils are equal, round, and reactive to light.   Fundoscopic exam:       The right eye shows no papilledema. The right eye shows venous pulsations.        The left eye shows no papilledema. The left eye shows venous pulsations.   Neck: Carotid bruit is not present.   Neurological: She is oriented to person, place, and time. She has a normal Finger-Nose-Finger Test and a normal Heel to Shin Test. Gait normal.   Reflex Scores:       Tricep reflexes are 2+ on the right side and 2+ on the left side.       Bicep reflexes are 2+ on the right side and 2+ on the left side.       Brachioradialis reflexes are 2+ on the right side and 2+ on the left side.       Patellar reflexes are 2+ on the right side and 2+ on the left side.       Achilles reflexes are 2+ on the right side and 2+ on the left side.  Psychiatric: Her speech is normal.     Neurologic Exam     Mental Status   Oriented to person, place, and time.   Registration of memory: Good recent and remote memory.   Attention: normal. Concentration: normal.   Speech: speech is normal   Level of consciousness: alert  Knowledge: consistent with education.     Cranial Nerves     CN II   Visual fields full to confrontation.   Visual acuity: normal    CN III, IV, VI   Pupils are equal, round, and reactive to light.  Extraocular motions are normal.     CN V   Facial sensation intact.   Right corneal reflex: normal  Left corneal reflex: normal    CN VII   Facial expression full, symmetric.   Right facial weakness: none  Left facial weakness: none    CN VIII   Hearing: intact    CN IX, X   Palate: symmetric    CN XI   Right sternocleidomastoid strength: normal  Left  sternocleidomastoid strength: normal    CN XII   Tongue: not atrophic  Tongue deviation: none    Motor Exam   Muscle bulk: normal  Right arm tone: normal  Left arm tone: normal  Right leg tone: normal  Left leg tone: normal    Strength   Strength 5/5 except as noted.     Sensory Exam   Light touch normal.     Gait, Coordination, and Reflexes     Gait  Gait: normal    Coordination   Finger to nose coordination: normal  Heel to shin coordination: normal    Reflexes   Right brachioradialis: 2+  Left brachioradialis: 2+  Right biceps: 2+  Left biceps: 2+  Right triceps: 2+  Left triceps: 2+  Right patellar: 2+  Left patellar: 2+  Right achilles: 2+  Left achilles: 2+  Right : 2+  Left : 2+      Assessment/Plan   Independent Review of Radiographic Studies:    I reviewed the new cervical MRI which didn't show any syrinx.  The flow study did show some diminished flow dorsally at the cervical medullary junction as expected.      Medical Decision Making:    Clinically she is actually doing better.  I just recommended observation.  I asked her come back and see me in 6 months.  If she stable we can make it on a yearly basis.  She doesn't need to be reimaged there is some worsening of symptoms.      Diagnoses and all orders for this visit:    Chiari malformation type I (CMS/HCC)    Numbness and tingling      Return in about 6 months (around 6/30/2019).

## 2018-12-21 NOTE — TELEPHONE ENCOUNTER
DENNISE for patient letting her know that her scans do show that she has a chiari and she can discuss more specific info with Dr JEAN-BAPTISTE at her 12.31.18 appt per Anny

## 2018-12-31 ENCOUNTER — OFFICE VISIT (OUTPATIENT)
Dept: NEUROSURGERY | Facility: CLINIC | Age: 33
End: 2018-12-31

## 2018-12-31 VITALS
BODY MASS INDEX: 20.48 KG/M2 | SYSTOLIC BLOOD PRESSURE: 90 MMHG | HEART RATE: 70 BPM | DIASTOLIC BLOOD PRESSURE: 60 MMHG | HEIGHT: 64 IN

## 2018-12-31 DIAGNOSIS — G93.5 CHIARI MALFORMATION TYPE I (HCC): Primary | ICD-10-CM

## 2018-12-31 DIAGNOSIS — R20.0 NUMBNESS AND TINGLING: ICD-10-CM

## 2018-12-31 DIAGNOSIS — R20.2 NUMBNESS AND TINGLING: ICD-10-CM

## 2018-12-31 PROCEDURE — 99213 OFFICE O/P EST LOW 20 MIN: CPT | Performed by: NEUROLOGICAL SURGERY

## 2019-06-11 NOTE — PROGRESS NOTES
Subjective   Patient ID: Sulma Flores is a 34 y.o. female is here today for follow-up.      Patient was last seen 12.31.18 for headaches, dizziness and neck pain    Patient states that her headaches are better, she now has intermittent frontal headaches that are mild, she has intermittent dizziness, mild intermittent neck pain.  No difficulty swallowing.      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 She presented with headaches and a sense of mental fogginess which I think were related. She also had some demonstrable nystagmus as well. She is better since I saw her last. The mental fogginess and headaches are more manageable. She has altered her exercise routine thereby avoiding anything that involves a lot of straining. We went over some exercise guidelines that basically prohibit any heavy lifting or anything that produces an intense Valsalva maneuver. Otherwise I think we will just watch her. I reassured her that the majority of the time when I watch people for small Chiari malformations we end up not doing anything at all. She will see me in 1 year.       Headache    The problem occurs intermittently. The problem has been gradually improving. The pain is located in the frontal region. The pain is at a severity of 2/10. The pain is mild. Associated symptoms include dizziness and neck pain.   Dizziness   The problem occurs intermittently. The problem has been gradually improving. Associated symptoms include headaches and neck pain.   Neck Pain    The problem occurs intermittently. The problem has been gradually improving. The pain is present in the midline. The pain is at a severity of 2/10. Associated symptoms include headaches.       The following portions of the patient's history were reviewed and updated as appropriate: allergies, current medications, past family history, past medical history, past social history, past surgical history and problem  list.    Review of Systems   Musculoskeletal: Positive for neck pain.   Neurological: Positive for dizziness and headaches.   All other systems reviewed and are negative.      Objective   Physical Exam   Constitutional: She is oriented to person, place, and time. She appears well-developed and well-nourished.   HENT:   Head: Normocephalic and atraumatic.   Eyes: Conjunctivae and EOM are normal. Pupils are equal, round, and reactive to light.   Fundoscopic exam:       The right eye shows no papilledema. The right eye shows venous pulsations.        The left eye shows no papilledema. The left eye shows venous pulsations.   Neck: Carotid bruit is not present.   Neurological: She is oriented to person, place, and time. She has a normal Finger-Nose-Finger Test and a normal Heel to Shin Test. Gait normal.   Reflex Scores:       Tricep reflexes are 2+ on the right side and 2+ on the left side.       Bicep reflexes are 2+ on the right side and 2+ on the left side.       Brachioradialis reflexes are 2+ on the right side and 2+ on the left side.       Patellar reflexes are 2+ on the right side and 2+ on the left side.       Achilles reflexes are 2+ on the right side and 2+ on the left side.  Psychiatric: Her speech is normal.     Neurologic Exam     Mental Status   Oriented to person, place, and time.   Registration of memory: Good recent and remote memory.   Attention: normal. Concentration: normal.   Speech: speech is normal   Level of consciousness: alert  Knowledge: consistent with education.     Cranial Nerves     CN II   Visual fields full to confrontation.   Visual acuity: normal    CN III, IV, VI   Pupils are equal, round, and reactive to light.  Extraocular motions are normal.     CN V   Facial sensation intact.   Right corneal reflex: normal  Left corneal reflex: normal    CN VII   Facial expression full, symmetric.   Right facial weakness: none  Left facial weakness: none    CN VIII   Hearing: intact    CN IX, X    Palate: symmetric    CN XI   Right sternocleidomastoid strength: normal  Left sternocleidomastoid strength: normal    CN XII   Tongue: not atrophic  Tongue deviation: none    Motor Exam   Muscle bulk: normal  Right arm tone: normal  Left arm tone: normal  Right leg tone: normal  Left leg tone: normal    Strength   Strength 5/5 except as noted.     Sensory Exam   Light touch normal.     Gait, Coordination, and Reflexes     Gait  Gait: normal    Coordination   Finger to nose coordination: normal  Heel to shin coordination: normal    Reflexes   Right brachioradialis: 2+  Left brachioradialis: 2+  Right biceps: 2+  Left biceps: 2+  Right triceps: 2+  Left triceps: 2+  Right patellar: 2+  Left patellar: 2+  Right achilles: 2+  Left achilles: 2+  Right : 2+  Left : 2+      Assessment/Plan   Independent Review of Radiographic Studies:    The last CINE MRI showed diminished flow across the cervical medullary junction as expected.      Medical Decision Making:    Clinically is better.  The mental fogginess and headaches are manageable.  I will see her in 1 year, sooner if there is some concerns about worsening of her symptoms.      Sulma was seen today for headache, dizziness and neck pain.    Diagnoses and all orders for this visit:    Chiari malformation type I (CMS/HCC)    Numbness and tingling    Degeneration of intervertebral disc at C5-C6 level      Return in about 1 year (around 6/14/2020).

## 2019-06-14 ENCOUNTER — OFFICE VISIT (OUTPATIENT)
Dept: NEUROSURGERY | Facility: CLINIC | Age: 34
End: 2019-06-14

## 2019-06-14 VITALS
HEIGHT: 64 IN | WEIGHT: 119 LBS | SYSTOLIC BLOOD PRESSURE: 108 MMHG | BODY MASS INDEX: 20.32 KG/M2 | DIASTOLIC BLOOD PRESSURE: 68 MMHG

## 2019-06-14 DIAGNOSIS — R20.0 NUMBNESS AND TINGLING: ICD-10-CM

## 2019-06-14 DIAGNOSIS — M50.322 DEGENERATION OF INTERVERTEBRAL DISC AT C5-C6 LEVEL: ICD-10-CM

## 2019-06-14 DIAGNOSIS — R20.2 NUMBNESS AND TINGLING: ICD-10-CM

## 2019-06-14 DIAGNOSIS — G93.5 CHIARI MALFORMATION TYPE I (HCC): Primary | ICD-10-CM

## 2019-06-14 PROCEDURE — 99213 OFFICE O/P EST LOW 20 MIN: CPT | Performed by: NEUROLOGICAL SURGERY

## 2020-02-20 ENCOUNTER — PATIENT MESSAGE (OUTPATIENT)
Dept: NEUROSURGERY | Facility: CLINIC | Age: 35
End: 2020-02-20

## 2020-08-20 ENCOUNTER — TELEPHONE (OUTPATIENT)
Dept: NEUROSURGERY | Facility: CLINIC | Age: 35
End: 2020-08-20

## 2020-08-20 NOTE — TELEPHONE ENCOUNTER
1YR FU PATIENT REQUEST IN PERSON VISIT   8.20.20 lm for pt appt time changed to 11: 30 am and appt reminder mailed/cc

## 2020-10-05 NOTE — PROGRESS NOTES
Subjective   History of Present Illness: Sulma Flores is a 35 y.o. female for televisit follow up.  Patient has relocated to [place], but will continue with her care in our office for her Chiari    Patient was last seen 6.14.19 for headaches and intermittent dizziness    Patient states that she is not currently having headaches or visual changes, she has some mental fogginess on occasion, no problems swallowing, she still has intermittent dizziness    You have chosen to receive care through a telephone visit. Do you consent to use a telephone visit for your medical care today? Yes    This was a tele-visit from my office that lasted 7 minutes.  She was at home and now lives in [place].  Since I saw her last year she has been relatively stable.  Very few episodes of mental fogginess.  She is changed her exercise routine so she does not do much straining and seems to be doing well.  I recommended that she follow-up with a neurosurgeon in [place] where she now lives.  She is going to get a primary care physician and I encouraged her to work with her primary care physician to get a referral.  I told her I did not personally know any of the neurosurgeons in [place].      Dizziness  The problem occurs intermittently. The problem has been unchanged. Pertinent negatives include no headaches, numbness or weakness.       The following portions of the patient's history were reviewed and updated as appropriate: allergies, current medications, past family history, past medical history, past social history, past surgical history and problem list.    Review of Systems   HENT: Negative for trouble swallowing.    Eyes: Negative for visual disturbance.   Neurological: Positive for dizziness. Negative for tremors, seizures, syncope, facial asymmetry, speech difficulty, weakness, light-headedness, numbness and headaches.   Psychiatric/Behavioral: Positive for confusion.           Objective             Physical Exam   Deferred  Neurologic  Exam   Deferred        Assessment/Plan   Independent Review of Radiographic Studies:      I personally reviewed the images from the following studies.    The last CINE MRI showed diminished flow across the cervical medullary junction as expected.    Medical Decision Making:      Clinically doing well.  She will transition her care to a neurosurgeon in [facility] where she now lives.  We will keep it open-ended.      Sulma was seen today for dizziness.    Diagnoses and all orders for this visit:    Chiari malformation type I (CMS/HCC)    Numbness and tingling    Degeneration of intervertebral disc at C5-C6 level      Return if symptoms worsen or fail to improve.

## 2020-10-12 ENCOUNTER — OFFICE VISIT (OUTPATIENT)
Dept: NEUROSURGERY | Facility: CLINIC | Age: 35
End: 2020-10-12

## 2020-10-12 DIAGNOSIS — R20.0 NUMBNESS AND TINGLING: ICD-10-CM

## 2020-10-12 DIAGNOSIS — G93.5 CHIARI MALFORMATION TYPE I (HCC): Primary | ICD-10-CM

## 2020-10-12 DIAGNOSIS — R20.2 NUMBNESS AND TINGLING: ICD-10-CM

## 2020-10-12 DIAGNOSIS — M50.322 DEGENERATION OF INTERVERTEBRAL DISC AT C5-C6 LEVEL: ICD-10-CM

## 2020-10-12 PROCEDURE — 99441 PR PHYS/QHP TELEPHONE EVALUATION 5-10 MIN: CPT | Performed by: NEUROLOGICAL SURGERY
